# Patient Record
Sex: MALE | Race: BLACK OR AFRICAN AMERICAN | NOT HISPANIC OR LATINO | Employment: STUDENT | URBAN - METROPOLITAN AREA
[De-identification: names, ages, dates, MRNs, and addresses within clinical notes are randomized per-mention and may not be internally consistent; named-entity substitution may affect disease eponyms.]

---

## 2023-02-24 LAB
HEPATITIS B VIRUS SURFACE AG PRESENCE IN SERUM: NONREACTIVE
HIV 1/ 2 AG/AB SCREEN: NONREACTIVE

## 2023-02-25 LAB
FLU A RESULT: NOT DETECTED
FLU B RESULT: NOT DETECTED
HERPES SIMPLEX VIRUS 1 IGG: <0.2 INDEX
HERPES SIMPLEX VIRUS 2 IGG: <0.2 INDEX
SARS-COV-2 RESULT: DETECTED
SYPHILIS TOTAL AB: NONREACTIVE

## 2024-03-29 ENCOUNTER — OFFICE VISIT (OUTPATIENT)
Dept: PRIMARY CARE | Facility: CLINIC | Age: 19
End: 2024-03-29

## 2024-03-29 VITALS
DIASTOLIC BLOOD PRESSURE: 69 MMHG | HEART RATE: 54 BPM | BODY MASS INDEX: 33.63 KG/M2 | SYSTOLIC BLOOD PRESSURE: 113 MMHG | HEIGHT: 71 IN | WEIGHT: 240.2 LBS

## 2024-03-29 DIAGNOSIS — R59.0 SUBMENTAL LYMPHADENOPATHY: Primary | ICD-10-CM

## 2024-03-29 DIAGNOSIS — J35.1 ENLARGED TONSILS: ICD-10-CM

## 2024-03-29 LAB — POC RAPID STREP: NEGATIVE

## 2024-03-29 PROCEDURE — 99213 OFFICE O/P EST LOW 20 MIN: CPT | Performed by: NURSE PRACTITIONER

## 2024-03-29 PROCEDURE — 87880 STREP A ASSAY W/OPTIC: CPT | Performed by: NURSE PRACTITIONER

## 2024-03-29 ASSESSMENT — ENCOUNTER SYMPTOMS
OCCASIONAL FEELINGS OF UNSTEADINESS: 0
DEPRESSION: 0
LOSS OF SENSATION IN FEET: 0

## 2024-03-29 ASSESSMENT — PATIENT HEALTH QUESTIONNAIRE - PHQ9
1. LITTLE INTEREST OR PLEASURE IN DOING THINGS: NOT AT ALL
2. FEELING DOWN, DEPRESSED OR HOPELESS: NOT AT ALL
SUM OF ALL RESPONSES TO PHQ9 QUESTIONS 1 AND 2: 0

## 2024-03-29 NOTE — PROGRESS NOTES
"Reason for Visit: swollen lymph node-neck.    HPI: Quincy is a 18 year old male who presents to the office today with concerns of swelling underneath chin. Stated he had a viral illness for 1 week, (stuffy nose, phlegm in throat and coughing). Admitted that swelling has nearly resolved.       Active Problem List  There is no problem list on file for this patient.      Comprehensive Medical/Surgical/Social/Family History  History reviewed. No pertinent past medical history.  History reviewed. No pertinent surgical history.  Social History     Social History Narrative    Not on file         Allergies and Medications  Patient has no known allergies.  No current outpatient medications on file prior to visit.     No current facility-administered medications on file prior to visit.         ROS otherwise negative aside from what was mentioned above in HPI.    Vitals  /69   Pulse 54   Ht 1.8 m (5' 10.87\")   Wt 109 kg (240 lb 3.2 oz)   BMI 33.62 kg/m²   Body mass index is 33.62 kg/m².  Physical Exam  Gen: Alert, NAD  HEENT:  PERRLA, EOMI, conjunctiva and sclera normal in appearance. External auditory canals/TMs normal; Oral cavity and posterior pharynx without lesions/exudate. Tonsils enlarged; no exudates on tonsils.  Neck:  Supple with FROM; Small mandibular node. No tenderness or fluctuance. Thyroid nontender and without nodules; No SOLANGE  Respiratory:  Lungs CTAB  Cardiovascular:  Heart RRR. No M/R/G. Peripheral pulses equal bilaterally  Abdomen:  Soft, nontender, BS present throughout; No R/G/R; No HSM or masses palpated  Extremities:  FROM all extremities; Muscle strength grossly normal with good tone  Neuro:  CN II-XII intact; Reflexes 2+/2+; Gross motor and sensory intact  Skin:  No suspicious lesions present  Breast: No masses, skin lesions or nipple discharges, no axillary lymphadenopathy    Assessment and Plan:  Problem List Items Addressed This Visit    None  Visit Diagnoses       Submental " lymphadenopathy    -  Primary    Enlarged tonsils        Relevant Orders    POCT rapid strep A manually resulted (Completed)        1) Enlarged tonsils: rapid Strep negative. Continue to monitor.     2) Submental lymphadenopathy: lymph nodes getting smaller. Continue to monitor. If lymph node gets larger, please notify me.

## 2025-09-09 ENCOUNTER — APPOINTMENT (OUTPATIENT)
Dept: PRIMARY CARE | Facility: CLINIC | Age: 20
End: 2025-09-09
Payer: MEDICAID